# Patient Record
Sex: FEMALE | Race: WHITE | NOT HISPANIC OR LATINO | Employment: OTHER | URBAN - METROPOLITAN AREA
[De-identification: names, ages, dates, MRNs, and addresses within clinical notes are randomized per-mention and may not be internally consistent; named-entity substitution may affect disease eponyms.]

---

## 2019-12-07 ENCOUNTER — HOSPITAL ENCOUNTER (EMERGENCY)
Facility: HOSPITAL | Age: 69
Discharge: HOME | End: 2019-12-07
Attending: EMERGENCY MEDICINE
Payer: MEDICARE

## 2019-12-07 VITALS
BODY MASS INDEX: 30.58 KG/M2 | TEMPERATURE: 98 F | SYSTOLIC BLOOD PRESSURE: 191 MMHG | OXYGEN SATURATION: 96 % | HEIGHT: 66 IN | RESPIRATION RATE: 18 BRPM | WEIGHT: 190.25 LBS | DIASTOLIC BLOOD PRESSURE: 85 MMHG | HEART RATE: 58 BPM
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
## 2019-12-07 DIAGNOSIS — S49.92XA INJURY OF LEFT SHOULDER, INITIAL ENCOUNTER: Primary | ICD-10-CM

## 2019-12-07 PROCEDURE — 25000003 PHARM REV CODE 250: Performed by: PHYSICIAN ASSISTANT

## 2019-12-07 PROCEDURE — 99284 EMERGENCY DEPT VISIT MOD MDM: CPT | Mod: 25

## 2019-12-07 RX ORDER — TRAMADOL HYDROCHLORIDE 50 MG/1
50 TABLET ORAL EVERY 6 HOURS PRN
Qty: 10 TABLET | Refills: 0 | Status: SHIPPED | OUTPATIENT
Start: 2019-12-07

## 2019-12-07 RX ORDER — TRAMADOL HYDROCHLORIDE 50 MG/1
50 TABLET ORAL
Status: COMPLETED | OUTPATIENT
Start: 2019-12-07 | End: 2019-12-07

## 2019-12-07 RX ADMIN — TRAMADOL HYDROCHLORIDE 50 MG: 50 TABLET, FILM COATED ORAL at 08:12

## 2019-12-08 NOTE — ED NOTES
Patient examined, evaluated, and educated on discharge instructions and prescriptions by NIGEL Piña without nursing assistance. Patient discharged to [facility] by Ayana.

## 2019-12-08 NOTE — ED PROVIDER NOTES
History      Chief Complaint   Patient presents with    Fall     Fell down steps of RV onto left shoulder, denies hitting head, denies LOC       Review of patient's allergies indicates:   Allergen Reactions    Codeine Diarrhea        HPI   HPI    12/7/2019, 7:51 PM   History obtained from the patient       History of Present Illness: Gracie Castillo is a 69 y.o. female patient who presents to the Emergency Department for left shoulder pain since trip and fall down RV steps.  She denies head injury or injury anywhere else. Symptoms are moderate in severity.     No further complaints or concerns at this time.           PCP: Primary Doctor No       Past Medical History:  No past medical history on file.      Past Surgical History:  No past surgical history on file.        Family History:  No family history on file.        Social History:  Social History     Tobacco Use    Smoking status: Not on file   Substance and Sexual Activity    Alcohol use: Not on file    Drug use: Not on file    Sexual activity: Not on file       ROS     Review of Systems   Constitutional: Negative for chills and fever.   HENT: Negative for facial swelling and trouble swallowing.    Eyes: Negative for discharge, redness and visual disturbance.   Respiratory: Negative for chest tightness and shortness of breath.    Cardiovascular: Negative for chest pain and leg swelling.   Gastrointestinal: Negative for diarrhea and vomiting.   Genitourinary: Negative for decreased urine volume and dysuria.   Musculoskeletal: Negative for joint swelling and neck stiffness.   Skin: Negative for rash and wound.   Neurological: Negative for syncope and facial asymmetry.   All other systems reviewed and are negative.      Physical Exam      Initial Vitals [12/07/19 1942]   BP Pulse Resp Temp SpO2   (!) 191/85 (!) 58 18 97.7 °F (36.5 °C) 96 %      MAP       --         Physical Exam  Vital signs and nursing notes reviewed.  Constitutional: Patient is in  "NAD. Awake and alert. Well-developed and well-nourished.  Head: Atraumatic. Normocephalic.  Eyes: PERRL. EOM intact. Conjunctivae nl. No scleral icterus.  ENT: Mucous membranes are moist. Oropharynx is clear.  Neck: Supple. No JVD. No lymphadenopathy.  No meningismus  Cardiovascular: Regular rate and rhythm. No murmurs, rubs, or gallops. Distal pulses are 2+ and symmetric.  Pulmonary/Chest: No respiratory distress. Clear to auscultation bilaterally. No wheezing, rales, or rhonchi.  Abdominal: Soft. Non-distended. No TTP. No rebound, guarding, or rigidity. Good bowel sounds.  Genitourinary: No CVA tenderness  Musculoskeletal: Moves all extremities. No edema.  Left shoulder guarding, limited rom due to pain, no clavicle, elbow or wrist pain.  2+ dp.  Normal sensation, and cap refill less than 2, to fingers x 5.  Skin: Warm and dry.  Neurological: Awake and alert. No acute focal neurological deficits are appreciated.  Psychiatric: Normal affect. Good eye contact. Appropriate in content.      ED Course          Procedures  ED Vital Signs:  Vitals:    12/07/19 1942   BP: (!) 191/85   Pulse: (!) 58   Resp: 18   Temp: 97.7 °F (36.5 °C)   TempSrc: Oral   SpO2: 96%   Weight: 86.3 kg (190 lb 4.1 oz)   Height: 5' 6" (1.676 m)               Imaging Results:  Imaging Results          X-Ray Humerus 2 View Left (Final result)  Result time 12/07/19 20:06:49    Final result by Jarrett Amor MD (12/07/19 20:06:49)                 Impression:      Normal left humerus.      Electronically signed by: Jarrett Amor MD  Date:    12/07/2019  Time:    20:06             Narrative:    EXAMINATION:  XR HUMERUS 2 VIEW LEFT    CLINICAL HISTORY:  Fall on same level from slipping, tripping and stumbling without subsequent striking against object, initial encounter    COMPARISON:  None    FINDINGS:  No osseous abnormality identified.                               X-Ray Shoulder Trauma Left (Final result)  Result time 12/07/19 20:06:20    Final " result by Jarrett Amor MD (12/07/19 20:06:20)                 Impression:      Degenerative changes of the acromioclavicular joint.  The glenohumeral joint is normal.      Electronically signed by: Jarrett Amor MD  Date:    12/07/2019  Time:    20:06             Narrative:    EXAMINATION:  XR SHOULDER TRAUMA 3 VIEW LEFT    CLINICAL HISTORY:  Fall on same level from slipping, tripping and stumbling without subsequent striking against object, initial encounter    TECHNIQUE:  Three views of the left shoulder were performed.    COMPARISON  None    FINDINGS:  Degenerative changes of the acromioclavicular joint.  The glenohumeral joint appears normal.                                   The Emergency Provider reviewed the vital signs and test results, which are outlined above.    ED Discussion             Medication(s) given in the ER:  Medications   traMADol tablet 50 mg (50 mg Oral Given 12/7/19 2005)           Follow-up Information     [facility] In 1 week.    Specialty:  Orthopedics  Contact information:  [id number] [facility] [id number] [phone]  Additional information:  1st Floor - From [address], take the [facility] exit [address] Enter the facility from the [address]                        Medication List      START taking these medications    traMADol 50 mg tablet  Commonly known as:  ULTRAM  Take 1 tablet (50 mg total) by mouth every 6 (six) hours as needed.           Where to Get Your Medications      You can get these medications from any pharmacy    Bring a paper prescription for each of these medications  · traMADol 50 mg tablet             Medical Decision Making        All findings were reviewed with the patient/family in detail.   All remaining questions and concerns were addressed at that time.  Patient/family has been counseled regarding the need for follow-up as well as the indication to return to the emergency room should new or worrisome developments  occur.        [facility]               Clinical Impression:        ICD-10-CM ICD-9-CM   1. Injury of left shoulder, initial encounter S49.92XA 959.2   2. Fall from slip, trip, or stumble, initial encounter W01.0XXA E885.9             Ayana Cheema PA-C  12/07/19 [id number]